# Patient Record
Sex: FEMALE | Race: WHITE
[De-identification: names, ages, dates, MRNs, and addresses within clinical notes are randomized per-mention and may not be internally consistent; named-entity substitution may affect disease eponyms.]

---

## 2018-08-27 ENCOUNTER — HOSPITAL ENCOUNTER (OUTPATIENT)
Dept: HOSPITAL 47 - RADNMMAIN | Age: 18
Discharge: HOME | End: 2018-08-27
Attending: FAMILY MEDICINE
Payer: COMMERCIAL

## 2018-08-27 DIAGNOSIS — R11.2: ICD-10-CM

## 2018-08-27 DIAGNOSIS — R10.11: ICD-10-CM

## 2018-08-27 DIAGNOSIS — D18.03: Primary | ICD-10-CM

## 2018-08-27 PROCEDURE — 78226 HEPATOBILIARY SYSTEM IMAGING: CPT

## 2018-08-27 NOTE — NM
EXAMINATION TYPE: NM hepatobiliary w EF

 

DATE OF EXAM: 8/27/2018

 

COMPARISON: NONE

 

HISTORY: Pain

 

TECHNIQUE: After the intravenous administration of 5.06 mCi Tc 99m Mebrofenin hepatobiliary scintigra
phy is performed.  Immediate images post injection.

 

FINDINGS: 

There is satisfactory initial accumulation of tracer by the liver.  The gallbladder is visualized wit
hin normal minutes.  The small bowel activity is noted within 60 minutes.  At one hour 8 ounces of or
al ensure plus is given to mimic CCK and gallbladder ejection fraction is calculated at 66 %, in the 
normal range.  Therefore there is no scintigraphic evidence of cystic or common bile duct obstruction
 to suggest acute cholecystitis or gallbladder dyskinesia. 

 

IMPRESSION: Exam is within normal limits.

## 2018-12-19 ENCOUNTER — HOSPITAL ENCOUNTER (OUTPATIENT)
Dept: HOSPITAL 47 - ORWHC2ENDO | Age: 18
Discharge: HOME | End: 2018-12-19
Payer: COMMERCIAL

## 2018-12-19 VITALS — DIASTOLIC BLOOD PRESSURE: 82 MMHG | SYSTOLIC BLOOD PRESSURE: 119 MMHG | HEART RATE: 74 BPM

## 2018-12-19 VITALS — RESPIRATION RATE: 16 BRPM | TEMPERATURE: 98.2 F

## 2018-12-19 DIAGNOSIS — F41.9: ICD-10-CM

## 2018-12-19 DIAGNOSIS — K29.50: Primary | ICD-10-CM

## 2018-12-19 DIAGNOSIS — K64.0: ICD-10-CM

## 2018-12-19 DIAGNOSIS — K44.9: ICD-10-CM

## 2018-12-19 DIAGNOSIS — R19.4: ICD-10-CM

## 2018-12-19 DIAGNOSIS — Z87.01: ICD-10-CM

## 2018-12-19 DIAGNOSIS — G43.909: ICD-10-CM

## 2018-12-19 DIAGNOSIS — Z79.899: ICD-10-CM

## 2018-12-19 DIAGNOSIS — E66.01: ICD-10-CM

## 2018-12-19 DIAGNOSIS — K21.0: ICD-10-CM

## 2018-12-19 DIAGNOSIS — K62.5: ICD-10-CM

## 2018-12-19 DIAGNOSIS — K64.4: ICD-10-CM

## 2018-12-19 DIAGNOSIS — Z79.890: ICD-10-CM

## 2018-12-19 PROCEDURE — 88305 TISSUE EXAM BY PATHOLOGIST: CPT

## 2018-12-19 PROCEDURE — 43239 EGD BIOPSY SINGLE/MULTIPLE: CPT

## 2018-12-19 PROCEDURE — 81025 URINE PREGNANCY TEST: CPT

## 2018-12-19 PROCEDURE — 45380 COLONOSCOPY AND BIOPSY: CPT

## 2018-12-19 NOTE — P.GSHP
History of Present Illness


H&P Date: 12/19/18











CHIEF COMPLAINT: GERD and change in bowel habits





HISTORY OF PRESENT ILLNESS: The patient is a 18-year-old female who


presents with gastroesophageal reflux disease and change in bowel habits.  


Upper and lower endoscopy were offered for further evaluation and management.





PAST MEDICAL HISTORY: 


Please see list.





PAST SURGICAL HISTORY: 


Please see list.





MEDICATIONS: 


Please see list.





ALLERGIES:  Please see list. 





SOCIAL HISTORY: No illicit drug use





FAMILY HISTORY: No reports of Crohn disease or ulcerative colitis. 





REVIEW OF ORGAN SYSTEMS: 


CONSTITUTIONAL: No reports of fevers or chills. 


GI:  Denies any blood in stools or constipation. 





PHYSICAL EXAM: 


VITAL SIGNS:  Stable


GENERAL: Well-developed pleasant in no acute distress. 


HEENT: No scleral icterus. Extraocular movements grossly


intact. Moist buccal mucosa. 


NECK: Supple without lymphadenopathy. 


CHEST: Unlabored respirations. Equal bilateral excursions. 


CARDIOVASCULAR: Regular rate and rhythm. Distal 2+ pulses. 


ABDOMEN: Soft, nondistended.  


MUSCULOSKELETAL: No clubbing, cyanosis, or edema. 





ASSESSMENT: 


1.  Gastroesophageal reflux disease


2.  Change in bowel habits





PLAN: 


1. Recommend proceeding with an upper and lower endoscopy





Past Medical History


Past Medical History: GERD/Reflux, Pneumonia


Additional Past Medical History / Comment(s): CURRENT:  ABD MIGRAINES


History of Any Multi-Drug Resistant Organisms: None Reported


Past Surgical History: Adenoidectomy, Tonsillectomy


Past Anesthesia/Blood Transfusion Reactions: Motion Sickness


Past Psychological History: Anxiety


Smoking Status: Never smoker


Past Alcohol Use History: Occasional


Past Drug Use History: None Reported





Medications and Allergies


 Home Medications











 Medication  Instructions  Recorded  Confirmed  Type


 


Calcium Carbonate [Tums] 2 tab PO AS DIRECTED 12/17/18 12/19/18 History


 


Topiramate [Topamax] 25 mg PO BID 12/17/18 12/19/18 History


 


Norgestimate-Ethinyl Estradiol 35 mcg PO DAILY 12/19/18 12/19/18 History





[Sprintec 28 Day Tablet]    











 Allergies











Allergy/AdvReac Type Severity Reaction Status Date / Time


 


No Known Allergies Allergy   Verified 12/19/18 09:52














Surgical - Exam


 Vital Signs











Temp Pulse Resp BP Pulse Ox


 


 98.2 F   90   16   139/85   98 


 


 12/19/18 09:47  12/19/18 09:47  12/19/18 09:47  12/19/18 09:47  12/19/18 09:47

## 2018-12-19 NOTE — P.PCN
Date of Procedure: 12/19/18


Description of Procedure: 





PREOPERATIVE DIAGNOSIS:


Altered bowel function


History of rectal bleeding





POSTOPERATIVE DIAGNOSIS:


Altered bowel function


History of rectal bleeding


External hemorrhoid, anal skin tag





OPERATION:


Colonoscopy to the ileocecal valve and appendiceal orifice 


Colonoscopy with random cold forceps biopsies





SURGEON: Amalia Batista MD.





ANESTHESIA: MAC.





INDICATIONS:


The patient is a 18-year-old female who presents for change in bowel habits and 

rectal bleeding.  Benefits and risks were described and informed consent was 

obtained.





DESCRIPTION OF PROCEDURE:


The patient had undergone Gatorade, MiraLAX and Dulcolax prep. She had been 

brought into the operating room and laid in the left lateral decubitus 

position. After adequate intravenous sedation, the rectum was examined with 2% 

lidocaine jelly. External hemorrhoids with small anal skin tag at 6:00 was 

encountered. The rectal tone was within normal limits. No lesions were palpated 

in the rectal vault. An Olympus colonoscope was advanced until the ileocecal 

valve and appendiceal orifice were clearly viewed.  The prep was good with 

visualization of the mucosal folds.   The scope was removed with visualization 

of each mucosal fold.  No scattered diverticulosis was encountered.  No colonic 

polyps were found.  No evidence of focal colitis was found.  Random biopsies 

were obtained throughout the colon for history of change in bowel habits.  No 

evidence of active bleeding was found.  Biopsies were obtained with cold 

forceps along the ileocecal valve and cecum for Crohn's colitis. Retroflexion 

of the scope demonstrated grade 1 internal hemorrhoids. The colon was 

desufflated. The patient had tolerated the procedure well. 





Withdrawal time was over 6 minutes.





FINDINGS:


Internal hemorrhoids, grade 1


External hemorrhoids with small anal skin tag at 6:00 was encountered as likely 

source of previous bleed.


No scattered diverticulosis.  


No adenomatous polyps.


No focal colitis.


Biopsies were obtained with cold forceps along the ileocecal valve and cecum 

for Crohn's colitis.





RECOMMENDATIONS:


Lower endoscopy every as needed.





Plan - Discharge Summary


Discharge Rx Participant: No


New Discharge Prescriptions: 


New


   Omeprazole 40 mg PO DAILY #30 capsule.dr Miller Action


   Topiramate [Topamax] 25 mg PO BID


   Calcium Carbonate [Tums] 2 tab PO AS DIRECTED


   Norgestimate-Ethinyl Estradiol [Sprintec 28 Day Tablet] 35 mcg PO DAILY


Discharge Medication List





Calcium Carbonate [Tums] 2 tab PO AS DIRECTED 12/17/18 [History]


Topiramate [Topamax] 25 mg PO BID 12/17/18 [History]


Norgestimate-Ethinyl Estradiol [Sprintec 28 Day Tablet] 35 mcg PO DAILY 12/19/ 18 [History]


Omeprazole 40 mg PO DAILY #30 capsule. 12/19/18 [Rx]








Follow up Appointment(s)/Referral(s): 


Amalia Batista MD [STAFF PHYSICIAN] - 01/08/19 9:00 am


Patient Instructions/Handouts:  *[facility] - (Anesthesia) Endoscopy Discharge 

Instructions, Gastroesophageal Reflux Disease (DC), Corrosive Esophagitis (DC), 

Colonoscopy (DC)


Discharge Disposition: HOME SELF-CARE

## 2018-12-19 NOTE — P.PCN
Date of Procedure: 12/19/18


Description of Procedure: 





PREOPERATIVE DIAGNOSIS:


Gastroesophageal reflux disease.


Morbid obesity.





POSTOPERATIVE DIAGNOSIS:


Gastroesophageal reflux disease.


Morbid obesity.


Gastritis.


Diaphragmatic hiatal hernia





OPERATION:


Esophagogastroduodenoscopy with biopsies along antrum and distal esophagus





SURGEON: Amalia Batista MD





ANESTHESIA: MAC.





INDICATIONS:


The patient is a 18-year-old female who presents with a history of reflux 

disease. Benefits and risks of the procedure were described. Informed consent 

was obtained.





DESCRIPTION:


The patient was brought into the endoscopy suite and laid in the left lateral 

decubitus position. An Olympus gastroscope was passed along the posterior 

oropharynx down to the distal esophagus where the squamocolumnar junction was 

encountered at 36 cm from the incisors. The stomach was entered and bile reflux 

was found.  Additional findings are listed below. Biopsies with cold forceps 

were obtained of the antrum and distal esophagus. The first through third 

portion of the duodenum was examined and unremarkable. Retroflexion of the 

scope confirmed  Hill grade 2 lower esophageal valve. The squamocolumnar 

junction demonstrated severe LA grade C erosive esophagitis. The stomach was 

desufflated. The patient tolerated the procedure well.





FINDINGS:


Squamocolumnar junction 36 cm from the incisors.


Diaphragmatic hiatus at 37 cm.


Hiatal hernia, 1 cm, sliding type


Hill grade 2 lower esophageal valve.


Severe erosive esophagitis LA grade C extending from 30 cm to 36 cm from the 

incisors


No active duodenitis.


Chronic gastritis 





RECOMMENDATIONS:


Upper endoscopy as needed.